# Patient Record
Sex: MALE
[De-identification: names, ages, dates, MRNs, and addresses within clinical notes are randomized per-mention and may not be internally consistent; named-entity substitution may affect disease eponyms.]

---

## 2021-09-19 ENCOUNTER — TRANSCRIPTION ENCOUNTER (OUTPATIENT)
Age: 56
End: 2021-09-19

## 2023-06-26 PROBLEM — Z00.00 ENCOUNTER FOR PREVENTIVE HEALTH EXAMINATION: Status: ACTIVE | Noted: 2023-06-26

## 2023-06-30 ENCOUNTER — APPOINTMENT (OUTPATIENT)
Dept: ORTHOPEDIC SURGERY | Facility: CLINIC | Age: 58
End: 2023-06-30
Payer: COMMERCIAL

## 2023-06-30 VITALS
DIASTOLIC BLOOD PRESSURE: 80 MMHG | WEIGHT: 210 LBS | BODY MASS INDEX: 34.99 KG/M2 | SYSTOLIC BLOOD PRESSURE: 136 MMHG | HEART RATE: 59 BPM | HEIGHT: 65 IN

## 2023-06-30 PROCEDURE — 99204 OFFICE O/P NEW MOD 45 MIN: CPT

## 2023-06-30 PROCEDURE — 73564 X-RAY EXAM KNEE 4 OR MORE: CPT | Mod: 50

## 2023-06-30 NOTE — DISCUSSION/SUMMARY
[de-identified] : I discussed nonoperative treatment options for their bilateral knee osteoarthritis. We discussed nonoperative treatments options including activity modification, therapy, bracing, nonsteroidal anti-inflammatory medications, and injections. The patient would like to continue with nonoperative treatment and would like to proceed with activity modification and weight loss, NSAIDS.\par  \par I discussed with them that I often prescribe an anti-inflammatory that should be taken once a day with meals to decrease pain and expedite symptom relief. They should not take this while also taking Aleve (Naprosyn), Motrin/ Advil (Ibuprofen), Toradol (ketoralac). They must stop taking it if they develops stomach pain, increased bleeding or bruising and they should follow-up with their primary care doctor for routine blood work including kidney function to monitor its effect. While it Is not a habit-forming substance, it should only  be taken as needed and to discontinue use once symptoms have resolved.  He would prefer to continue with over-the-counter medication.\par \par We discussed that when nonoperative treatment is no longer successful and their pain is severe enough that it is affecting their ability to perform activities of daily living, a joint replacement may help them.\par \par My cumulative time spent on this patient’s visit included: Preparation for the visit, review of the medical records, review of pertinent diagnostic studies, examination and counseling of the patient on the above diagnosis, treatment plan and prognosis, orders of diagnostic tests, medications and/or appropriate procedures and documentation in the medical records of today’s visit.\par \par

## 2023-06-30 NOTE — HISTORY OF PRESENT ILLNESS
[de-identified] : KAREN SUERO  is an 58 year-old male presents today with a chief complaint of bilateral left greater than right knee pain. Patient describes onset over the last number of years, but it may be going on for longer. \par \par Patient points to the medial and lateral aspect of knee as maximum point of tenderness. Pain is typically 5/10 in severity, dull and achy but sometimes sharp in quality with certain activities. Symptoms are exacerbated by activity, or even walking/standing. They are improved with rest, and ice. Patient denies any radiation of symptoms beyond the surrounding area. Hip and ankle appear to be largely unrelated. \par \par To address the pathology, they have tried OTC medications/NSAIDs with some relief, even though short lasting. Injections have been attempted. Exercise therapy has had only temporary relief but has helped maintain greater than 50 % range of motion. Things have gotten bad enough that patient will need assistive devices like the banister for going up and down stairs. They may need a cane. \par \par At this point the patient is quite frustrated by their condition. As duration of symptoms exceeds years, they are here for further evaluation and discussion of possible diagnoses and management. They deny any further trauma. No fever or chills, no signs of infection. Today, patient does not state any other associated signs or complains outside of those described. \par \par A complete review of symptoms as well as past medical/surgical history, medications, allergies, social and family history, and other details of HPI and exam were reviewed per first visit intake form and updated accordingly. Additional and more relevant details are noted in further detail today.\par

## 2023-06-30 NOTE — PHYSICAL EXAM
[de-identified] : General Appearance / Station: Well developed, well nourished, in no acute distress \par Orientation: Oriented to person, place, and time\par Gait & Station: Ambulates without assistive device\par Neurologic: Normal leg sensation \par Cardiovascular: Warm extremity \par Lymphatics: No lymphedema \par Generalized Ligament Laxity: Normal \par Stiffness: Normal \par \par LUMBAR SPINE:\par Nontender  at lumbar spine\par Straight leg raise: Negative \par Motor: 5/5 motor L2-S1\par Sensation: Intact  L2-S1\par \par LEFT HIP:\par Range of motion: Painless  internal and external rotation of the hip.\par Strength: Within Normal Limits \par Palpation: Nontender  at greater trochanter. Nontender  at SI joint\par Stinchfield: Negative \par FADIR: Negative \par HERNANDEZ: Negative \par \par SYMPTOMATIC LEFT KNEE:\par Alignment: VARUS \par Skin: normal\par Effusion: none .\par Quadriceps: normal .\par Range of motion: symmetrical but painful .\par PF crepitus: 1+.\par PF apprehension: none .\par Patella / Patella Tendon: nontender .\par Lachman's: negative \par Valgus @ 30°: negative.\par Varus @ 30°: negative.\par Posterior drawer: negative.\par Palpation: TENDER AT MEDIAL AND LATERAL JOINT LINE.\par Meniscus signs: MEDIAL JOINT LINE\par \par SYMPTOMATIC RIGHT KNEE:\par Alignment: VARUS \par Skin: normal\par Effusion: none .\par Quadriceps: normal .\par Range of motion: symmetrical but painful .\par PF crepitus: 1+.\par PF apprehension: none .\par Patella / Patella Tendon: nontender .\par Lachman's: negative \par Valgus @ 30°: negative.\par Varus @ 30°: negative.\par Posterior drawer: negative.\par Palpation: TENDER AT MEDIAL AND LATERAL JOINT LINE.\par Meniscus signs: MEDIAL JOINT LINE [de-identified] : Imaging: Standing 4 views of the left knee show left knee severe arthritis worse in the medial and patellofemoral compartment with loss of joint space, subchondral sclerosis, marginal osteophyte formations, and subchondral cyst formation. There are no signs of fracture. There is no knee effusion. The soft tissues appear unremarkable\par \par Imaging: Standing 4 views of the right knee show right knee severe arthritis worse in the medial patellofemoral compartment with loss of joint space, subchondral sclerosis, marginal osteophyte formations, and subchondral cyst formation. There are no signs of fracture. There is no [] knee effusion. The soft tissues appear unremarkable\par

## 2023-12-08 ENCOUNTER — APPOINTMENT (OUTPATIENT)
Dept: ORTHOPEDIC SURGERY | Facility: CLINIC | Age: 58
End: 2023-12-08

## 2024-03-01 ENCOUNTER — APPOINTMENT (OUTPATIENT)
Dept: ORTHOPEDIC SURGERY | Facility: CLINIC | Age: 59
End: 2024-03-01
Payer: COMMERCIAL

## 2024-03-01 VITALS
SYSTOLIC BLOOD PRESSURE: 146 MMHG | HEART RATE: 65 BPM | DIASTOLIC BLOOD PRESSURE: 87 MMHG | WEIGHT: 210 LBS | HEIGHT: 65 IN | BODY MASS INDEX: 34.99 KG/M2

## 2024-03-01 DIAGNOSIS — M25.562 PAIN IN RIGHT KNEE: ICD-10-CM

## 2024-03-01 DIAGNOSIS — M25.561 PAIN IN RIGHT KNEE: ICD-10-CM

## 2024-03-01 PROCEDURE — 20610 DRAIN/INJ JOINT/BURSA W/O US: CPT | Mod: 50

## 2024-03-01 PROCEDURE — 99214 OFFICE O/P EST MOD 30 MIN: CPT | Mod: 25

## 2024-03-01 RX ORDER — METHYLPRED ACET/NACL,ISO-OS/PF 40 MG/ML
40 VIAL (ML) INJECTION
Qty: 1 | Refills: 0 | Status: COMPLETED | OUTPATIENT
Start: 2024-03-01

## 2024-03-01 RX ORDER — LIDOCAINE HYDROCHLORIDE 10 MG/ML
1 INJECTION, SOLUTION INFILTRATION; PERINEURAL
Refills: 0 | Status: COMPLETED | OUTPATIENT
Start: 2024-03-01

## 2024-03-01 RX ADMIN — METHYLPREDNISOLONE ACETATE 1 MG/ML: 40 INJECTION, SUSPENSION INTRA-ARTICULAR; INTRALESIONAL; INTRAMUSCULAR; SOFT TISSUE at 00:00

## 2024-03-01 RX ADMIN — LIDOCAINE HYDROCHLORIDE 4 %: 10 INJECTION, SOLUTION EPIDURAL; INFILTRATION; INTRACAUDAL; PERINEURAL at 00:00

## 2024-03-01 NOTE — PROCEDURE
[de-identified] : Injection: Right knee joint. Indication: Osteoarthritis.   A discussion was had with the patient regarding this procedure and all questions were answered. All risks, benefits and alternatives were discussed. These included but were not limited to bleeding, infection, allergic reaction and reaccumulation of fluid. A timeout was done to ensure correct side and patient agreed to the procedure.  A [place] richard was created on the skin utilizing a plastic needle cap to richard the anticipated point of entry. Alcohol was used to clean the skin, and chloraprep was used to sterilize and prep the area in the lateral joint line aspect of the knee. Ethyl chloride spray was then used as a topical anesthetic. A 22-gauge needle was used to inject 4cc 1% lidocaine without epinephrine  and 1cc of 40mg/ml methylprednisolone into the knee. A sterile bandage was then applied. The patient tolerated the procedure well.   Injection: Left knee joint. Indication: Osteoarthritis.   A discussion was had with the patient regarding this procedure and all questions were answered. All risks, benefits and alternatives were discussed. These included but were not limited to bleeding, infection, allergic reaction and reaccumulation of fluid. A timeout was done to ensure correct side and patient agreed to the procedure.  A [place] richard was created on the skin utilizing a plastic needle cap to richard the anticipated point of entry. Alcohol was used to clean the skin, and chloraprep was used to sterilize and prep the area in the lateral joint line aspect of the knee. Ethyl chloride spray was then used as a topical anesthetic. A 22-gauge needle was used to inject 4cc 1% lidocaine without epinephrine,  and 1cc of 40mg/ml methylprednisolone into the knee. A sterile bandage was then applied. The patient tolerated the procedure well.

## 2024-03-01 NOTE — HISTORY OF PRESENT ILLNESS
[de-identified] : KAREN SUERO  is an 58 year-old male presents today with a chief complaint of chronic bilateral knee pain that has failed other conservative treatments. We discussed corticosteroid injection at our previous visit and was granted insurance approval. The patient presents for a knee corticosteroid injection

## 2024-03-01 NOTE — PHYSICAL EXAM
[de-identified] : General Appearance / Station: Well developed, well nourished, in no acute distress  Orientation: Oriented to person, place, and time Gait & Station: Ambulates without assistive device Neurologic: Normal leg sensation  Cardiovascular: Warm extremity  Lymphatics: No lymphedema  Generalized Ligament Laxity: Normal  Stiffness: Normal   RIGHT HIP: Range of motion: Painless  internal and external rotation of the hip. Strength: Within Normal Limits  Palpation: Nontender  at greater trochanter. Nontender  at SI joint Stinchfield: Negative  FADIR: Negative  HERNANDEZ: Negative   SYMPTOMATIC RIGHT KNEE: Alignment: VARUS Skin: normal Effusion: none . Quadriceps: normal . Range of motion: symmetric but painful . PF crepitus: 1+. PF apprehension: none . Patella / Patella Tendon: nontender . Lachman's: negative  Valgus @ 30: negative. Varus @ 30: negative. Posterior drawer: negative. Palpation: TENDER AT medial lateral Meniscus signs: MEDIAL JOINT LINE  LEFT HIP: Range of motion: Painless  internal and external rotation of the hip. Strength: Within Normal Limits  Palpation: Nontender  at greater trochanter. Nontender  at SI joint Stinchfield: Negative  FADIR: Negative  HERNANDEZ: Negative  SYMPTOMATIC LEFT KNEE: Alignment: VARUS Skin: normal Effusion: none . Quadriceps: normal . Range of motion: symmetric but painful . PF crepitus: 1+. PF apprehension: none . Patella / Patella Tendon: nontender . Lachman's: negative  Valgus @ 30: negative. Varus @ 30: negative. Posterior drawer: negative. Palpation: TENDER AT medial lateral Meniscus signs: MEDIAL JOINT LINE

## 2024-03-01 NOTE — DISCUSSION/SUMMARY
[de-identified] : Patient presented for bilateral knee injections.  He is scheduled to go to vacation in [place] in May.  He will return after vacation to discuss joint replacement.  All questions were answered and he stated he is in agreement the plan

## 2024-03-15 ENCOUNTER — TRANSCRIPTION ENCOUNTER (OUTPATIENT)
Age: 59
End: 2024-03-15

## 2024-04-19 ENCOUNTER — APPOINTMENT (OUTPATIENT)
Dept: ORTHOPEDIC SURGERY | Facility: CLINIC | Age: 59
End: 2024-04-19
Payer: COMMERCIAL

## 2024-04-19 VITALS — SYSTOLIC BLOOD PRESSURE: 137 MMHG | DIASTOLIC BLOOD PRESSURE: 87 MMHG | HEART RATE: 57 BPM

## 2024-04-19 DIAGNOSIS — M25.569 PAIN IN UNSPECIFIED KNEE: ICD-10-CM

## 2024-04-19 PROCEDURE — 73564 X-RAY EXAM KNEE 4 OR MORE: CPT | Mod: 50

## 2024-04-19 PROCEDURE — 99213 OFFICE O/P EST LOW 20 MIN: CPT

## 2024-05-07 ENCOUNTER — APPOINTMENT (OUTPATIENT)
Dept: ORTHOPEDIC SURGERY | Facility: CLINIC | Age: 59
End: 2024-05-07
Payer: COMMERCIAL

## 2024-05-07 DIAGNOSIS — M22.40 CHONDROMALACIA PATELLAE, UNSPECIFIED KNEE: ICD-10-CM

## 2024-05-07 DIAGNOSIS — M17.10 UNILATERAL PRIMARY OSTEOARTHRITIS, UNSPECIFIED KNEE: ICD-10-CM

## 2024-05-07 PROCEDURE — 99214 OFFICE O/P EST MOD 30 MIN: CPT | Mod: 25

## 2024-05-07 PROCEDURE — 20610 DRAIN/INJ JOINT/BURSA W/O US: CPT | Mod: 50

## 2024-05-07 RX ORDER — METHYLPRED ACET/NACL,ISO-OS/PF 40 MG/ML
40 VIAL (ML) INJECTION
Refills: 0 | Status: COMPLETED | OUTPATIENT
Start: 2024-05-07

## 2024-05-07 RX ORDER — LIDOCAINE HYDROCHLORIDE 10 MG/ML
1 INJECTION, SOLUTION INFILTRATION; PERINEURAL
Refills: 0 | Status: COMPLETED | OUTPATIENT
Start: 2024-05-07

## 2024-05-07 RX ORDER — DICLOFENAC SODIUM 20 MG/G
2 SOLUTION TOPICAL 3 TIMES DAILY
Qty: 1 | Refills: 0 | Status: ACTIVE | COMMUNITY
Start: 2024-05-07 | End: 1900-01-01

## 2024-05-07 RX ADMIN — LIDOCAINE HYDROCHLORIDE 4 %: 10 INJECTION, SOLUTION EPIDURAL; INFILTRATION; INTRACAUDAL; PERINEURAL at 00:00

## 2024-05-07 RX ADMIN — METHYLPREDNISOLONE ACETATE 1 MG/ML: 40 INJECTION, SUSPENSION INTRA-ARTICULAR; INTRALESIONAL; INTRAMUSCULAR; SOFT TISSUE at 00:00

## 2024-05-07 NOTE — DISCUSSION/SUMMARY
[de-identified] : 59yM pw bl knee OA.  The patient was extensively counseled on treatment options including but not limited to observation, rest/activity modification, bracing, anti-inflammatory medications, physical therapy, injections, and surgery.  The natural history of the disease was thoroughly explained.  We discussed that the majority of the time, this condition can be initially treated conservatively. The risks, benefits, and alternatives to an injection were reviewed with the patient.  Risks outlined include but are not limited to infection, sepsis, bleeding, scarring, temporary increase in pain, syncope, failure to resolve symptoms, symptom recurrence, allergic reaction and a flare.  The patient understood these risks and wished to proceed with an injection, providing verbal consent. Under sterile conditions using chlorhexidine and an ethyl chloride spray for topic analgesia, an injection of 4cc 1% lidocaine without epinephrine and 1cc 40mg/ml depomedrol was placed in the R and L knees. The patient tolerated the procedure well without complication.  The patient was advised to call if redness, pain or fever occur and to apply ice for 15 minutes every hour for the next day as tolerated.  The patient will proceed with: -HEP -diclofenac rx provided - pt instructed to take with meals and not with other nsaid's including advil, aleve, and toradol.  The pt understands to stop taking the medication if any stomach sx develop or they develop any type of bleeding or bruising, at which point they will present to their PMD -pt was instructed on the importance of resting, icing and elevating to minimize swelling -RTC  Dr. Ruelas   I have personally obtained the history, reviewed the ROS as noted, and performed the physical examination today.  The patient and I discussed the assessment and options and developed the plan.  All questions were answered and the patient stated their understanding of the treatment plan and appreciation of the visit.   My cumulative time spent on this patient's visit included: Preparation for the visit, review of the medical records, review of pertinent diagnostic studies, examination and counseling of the patient on the above diagnosis, treatment plan and prognosis, orders of diagnostic tests, medications and/or appropriate procedures and documentation in the medical records of today's visit.   Thanh Hill MD

## 2024-05-07 NOTE — HISTORY OF PRESENT ILLNESS
[de-identified] : 59yM pw bl knee pain.  Former avid .  Traveling to [place] for much of the summer.   Pt notes extending sitting and ascending/descending stairs worsens the pain and symptoms.  Does not recall an inciting traumatic event.  Pt has tried activity modification and NSAIDs with minimal relief, pain level remains a 6/10.  Also notes increased stiffness/worse motion in the knee.  Good relief from inj 3m ago.  Pt denies numbness, paresthesias, f/c.   Pt notes the pain interferes with activities of daily life and that overall mobility has been decreased.  They wish to discuss possible treatment options and return to baseline activity and mobility before symptomatic onset.  Referred by Dr. Ruelas.  Considering TKA end of year.

## 2024-05-07 NOTE — REASON FOR VISIT
Continue Regimen: Triamcinolone cream 0.1% bid Otc Regimen: CeraVe cream Detail Level: Detailed [Initial Visit] : an initial visit for

## 2024-05-07 NOTE — PHYSICAL EXAM
[de-identified] : Gen: NAD Resp: Nonlabored respirations, no SOB Gait: Nl   bl Knee: Skin intact No effusion 0-110 AROM Tender MJL 5/5 IP Q EHL TA GS SILT L3-S1 2+ dp pt wwp bcr   1A lachman and (-) pivot shift Grade 1 posterior drawer Stable to v/v at 0 and 30 degrees (-) Dial test at 30, 90 degrees   (+) [place], [place] Medial joint pain with shallow 2 leg squat   1+ patellar translation (-) pain with patellar compression/grind (-) J sign (-) apprehension  [de-identified] : I independently reviewed and interpreted the xrays of the knees - moderate tricompartmental OA.  No other acute osseous abnormalities.

## 2024-09-20 ENCOUNTER — APPOINTMENT (OUTPATIENT)
Dept: ORTHOPEDIC SURGERY | Facility: CLINIC | Age: 59
End: 2024-09-20
Payer: COMMERCIAL

## 2024-09-20 PROCEDURE — 99213 OFFICE O/P EST LOW 20 MIN: CPT

## 2024-09-20 NOTE — HISTORY OF PRESENT ILLNESS
[de-identified] : Patient presents for follow-up of left knee swelling that began several weeks ago.  He has no pain and denies fevers or chills.

## 2024-09-20 NOTE — DISCUSSION/SUMMARY
[de-identified] : Patient presents with a left knee prepatellar bursitis.  I recommended compression and ice and anti-inflammatory medication.  I explained that unless it becomes painful we usually do not drain it because there is a risk of continued weeping and drainage.  He is going to discuss with his family possible knee replacement surgery in the winter.  He will follow-up with me as needed for further discussion of knee replacement.  All questions answered he is in agreement the plan

## 2024-09-20 NOTE — PHYSICAL EXAM
[de-identified] : General Appearance / Station: Well developed, well nourished, in no acute distress  Orientation: Oriented to person, place, and time Gait & Station: Ambulates without assistive device Neurologic: Normal leg sensation  Cardiovascular: Warm extremity  Lymphatics: No lymphedema  Generalized Ligament Laxity: Normal  Stiffness: Normal   RIGHT HIP: Range of motion: Painless  internal and external rotation of the hip. Strength: Within Normal Limits  Palpation: Nontender  at greater trochanter. Nontender  at SI joint Stinchfield: Negative  FADIR: Negative  HERNANDEZ: Negative   SYMPTOMATIC RIGHT KNEE: Alignment: VARUS Skin: normal Effusion: none . Quadriceps: normal . Range of motion: symmetric but painful . PF crepitus: 1+. PF apprehension: none . Patella / Patella Tendon: nontender . Lachman's: negative  Valgus @ 30: negative. Varus @ 30: negative. Posterior drawer: negative. Palpation: TENDER AT medial lateral Meniscus signs: MEDIAL JOINT LINE  LEFT HIP: Range of motion: Painless  internal and external rotation of the hip. Strength: Within Normal Limits  Palpation: Nontender  at greater trochanter. Nontender  at SI joint Stinchfield: Negative  FADIR: Negative  HERNANDEZ: Negative  SYMPTOMATIC LEFT KNEE: Alignment: VARUS Skin: normal there is a prepatellar bursitis without erythema warmth or tenderness. Effusion: none . Quadriceps: normal . Range of motion: symmetric but painful . PF crepitus: 1+. PF apprehension: none . Patella / Patella Tendon: nontender . Lachman's: negative  Valgus @ 30: negative. Varus @ 30: negative. Posterior drawer: negative. Palpation: TENDER AT medial lateral Meniscus signs: MEDIAL JOINT LINE